# Patient Record
Sex: FEMALE | Race: WHITE
[De-identification: names, ages, dates, MRNs, and addresses within clinical notes are randomized per-mention and may not be internally consistent; named-entity substitution may affect disease eponyms.]

---

## 2020-08-05 ENCOUNTER — HOSPITAL ENCOUNTER (OUTPATIENT)
Dept: HOSPITAL 92 - BICMAMMO | Age: 40
Discharge: HOME | End: 2020-08-05
Attending: FAMILY MEDICINE
Payer: MEDICAID

## 2020-08-05 DIAGNOSIS — Z80.3: ICD-10-CM

## 2020-08-05 DIAGNOSIS — Z12.31: Primary | ICD-10-CM

## 2020-08-05 PROCEDURE — 77067 SCR MAMMO BI INCL CAD: CPT

## 2020-08-05 NOTE — MMO
Bilateral MAMMO Bilat Screen [facility]DI.

 

CLINICAL HISTORY:

Patient is 39 years old and is seen for screening. The patient has the following

family history of breast cancer:  aunt, malignant (generic).  The patient has no

personal history of cancer.

 

VIEWS:

The views performed were:  bilateral craniocaudal and bilateral mediolateral

oblique.

 

FILMS COMPARED:

The present examination has been compared to a prior imaging study performed at

[facility] on 11/17/2014.

 

This study has been interpreted with the assistance of computer-aided detection.

 

MAMMOGRAM FINDINGS:

There are scattered fibroglandular densities.

 

There are no suspicious masses, suspicious calcifications, or new areas of

architectural distortion.

 

IMPRESSION:

THERE IS NO MAMMOGRAPHIC EVIDENCE OF MALIGNANCY.

 

A ROUTINE FOLLOW-UP MAMMOGRAM IN 1 YEAR IS RECOMMENDED.

 

ACR BI-RADS Category 1 - Negative

 

MAMMOGRAPHY NOTE:

 1. A negative mammogram report should not delay a biopsy if a dominant of

 clinically suspicious mass is present.

 2. Approximately 10% to 15% of breast cancers are not detected by

 mammography.

 3. Adenosis and dense breasts may obscure an underlying neoplasm.

 

 

Reported by: LISBETH YEUNG MD

Electonically Signed: [id number]

## 2021-05-01 ENCOUNTER — HOSPITAL ENCOUNTER (EMERGENCY)
Dept: HOSPITAL 92 - ERS | Age: 41
Discharge: HOME | End: 2021-05-01
Payer: MEDICAID

## 2021-05-01 DIAGNOSIS — M19.90: ICD-10-CM

## 2021-05-01 DIAGNOSIS — M48.00: ICD-10-CM

## 2021-05-01 DIAGNOSIS — M79.671: Primary | ICD-10-CM
